# Patient Record
Sex: FEMALE | Race: WHITE | Employment: FULL TIME | URBAN - METROPOLITAN AREA
[De-identification: names, ages, dates, MRNs, and addresses within clinical notes are randomized per-mention and may not be internally consistent; named-entity substitution may affect disease eponyms.]

---

## 2021-01-11 ENCOUNTER — HOSPITAL ENCOUNTER (OUTPATIENT)
Dept: MAMMOGRAPHY | Age: 61
Discharge: HOME | End: 2021-01-13
Payer: COMMERCIAL

## 2021-01-11 DIAGNOSIS — Z12.31 VISIT FOR SCREENING MAMMOGRAM: ICD-10-CM

## 2021-01-11 PROCEDURE — 77067 SCR MAMMO BI INCL CAD: CPT

## 2021-01-13 ENCOUNTER — TELEPHONE (OUTPATIENT)
Dept: GENERAL RADIOLOGY | Age: 61
End: 2021-01-13

## 2021-01-18 ENCOUNTER — CLINICAL DOCUMENTATION (OUTPATIENT)
Dept: GENERAL RADIOLOGY | Age: 61
End: 2021-01-18

## 2021-01-20 ENCOUNTER — HOSPITAL ENCOUNTER (OUTPATIENT)
Dept: ULTRASOUND IMAGING | Age: 61
Discharge: HOME | End: 2021-01-20
Payer: COMMERCIAL

## 2021-01-20 DIAGNOSIS — N64.89 BREAST ASYMMETRY: ICD-10-CM

## 2021-01-20 PROCEDURE — 76642 ULTRASOUND BREAST LIMITED: CPT

## 2021-02-01 ENCOUNTER — OFFICE VISIT (OUTPATIENT)
Dept: SURGERY | Age: 61
End: 2021-02-01
Payer: COMMERCIAL

## 2021-02-01 VITALS
DIASTOLIC BLOOD PRESSURE: 76 MMHG | WEIGHT: 150 LBS | HEIGHT: 66 IN | RESPIRATION RATE: 18 BRPM | BODY MASS INDEX: 24.11 KG/M2 | TEMPERATURE: 97.2 F | HEART RATE: 88 BPM | SYSTOLIC BLOOD PRESSURE: 127 MMHG

## 2021-02-01 DIAGNOSIS — R92.8 ABNORMAL MAMMOGRAM OF LEFT BREAST: Primary | ICD-10-CM

## 2021-02-01 PROCEDURE — 99204 OFFICE O/P NEW MOD 45 MIN: CPT | Performed by: SURGERY

## 2021-02-01 NOTE — PROGRESS NOTES
General Surgery History and Physical    Patient's Name/Date of Birth: Dot Soares / 1960    Date: February 1, 2021     Surgeon: Miguel Garduno M.D.    PCP: Emy Hamilton MD     Chief Complaint: abnormal mammography or ultrasound    HPI:   Dot Soares is a 56 y.o. female who presents for evaluation of abnormal breast imaging with no nipple discharge,  no family history. 13 menarche, hyster in 45s for non cancer, 2 live births, and no breast feeding, no hormone replacement. One previous biopsy on left that was benign. Past Medical History:   Diagnosis Date    Enlarged uterus     Heavy menstrual bleeding     Hyperlipidemia     not on medication       Past Surgical History:   Procedure Laterality Date    BREAST SURGERY  2011    cyst benign    CYST REMOVAL      head    CYST REMOVAL  7/16/14 excision 4 sebaceous cyst scalp and skin lesion left lateral chest    DILATION AND CURETTAGE OF UTERUS      HYSTERECTOMY  04/01/2013    laparoscopic robotic assisted  with bso    TONSILLECTOMY         Current Outpatient Medications   Medication Sig Dispense Refill    Calcium-Magnesium-Vitamin D (CALCIUM 1200+D3 PO) Take 1 tablet by mouth daily. No current facility-administered medications for this visit. No Known Allergies    No family history on file.     Social History     Socioeconomic History    Marital status:      Spouse name: Not on file    Number of children: Not on file    Years of education: Not on file    Highest education level: Not on file   Occupational History    Not on file   Social Needs    Financial resource strain: Not on file    Food insecurity     Worry: Not on file     Inability: Not on file    Transportation needs     Medical: Not on file     Non-medical: Not on file   Tobacco Use    Smoking status: Never Smoker    Smokeless tobacco: Never Used   Substance and Sexual Activity    Alcohol use: No     Alcohol/week: 0.0 standard drinks     Comment: occ    Drug use: No    Sexual activity: Not on file   Lifestyle    Physical activity     Days per week: Not on file     Minutes per session: Not on file    Stress: Not on file   Relationships    Social connections     Talks on phone: Not on file     Gets together: Not on file     Attends Voodoo service: Not on file     Active member of club or organization: Not on file     Attends meetings of clubs or organizations: Not on file     Relationship status: Not on file    Intimate partner violence     Fear of current or ex partner: Not on file     Emotionally abused: Not on file     Physically abused: Not on file     Forced sexual activity: Not on file   Other Topics Concern    Not on file   Social History Narrative    Not on file           Review of Systems  Review of Systems -  General ROS: negative for - chills, fatigue or malaise  ENT ROS: negative for - hearing change, nasal congestion or nasal discharge  Allergy and Immunology ROS: negative for - hives, itchy/watery eyes or nasal congestion  Hematological and Lymphatic ROS: negative for - blood clots, blood transfusions, bruising or fatigue  Endocrine ROS: negative for - malaise/lethargy, mood swings, palpitations or polydipsia/polyuria  Breast ROS: neg for - new or changing breast lumps  Respiratory ROS: negative for - sputum changes, stridor, tachypnea or wheezing  Cardiovascular ROS: negative for - irregular heartbeat, loss of consciousness, murmur or orthopnea  Gastrointestinal ROS: negative for - constipation, diarrhea, gas/bloating, heartburn or hematemesis  Genito-Urinary ROS: negative for -  genital discharge, genital ulcers or hematuria  Musculoskeletal ROS: negative for - gait disturbance, muscle pain or muscular weakness    Physical exam:   /76   Pulse 88   Temp 97.2 °F (36.2 °C) (Temporal)   Resp 18   Ht 5' 6\" (1.676 m)   Wt 150 lb (68 kg)   LMP 04/06/2013   BMI 24.21 kg/m²   General appearance:  NAD  Head: NCAT, PERRLA, EOMI, red conjunctiva  Neck: supple, no masses  Lungs: CTAB, equal chest rise bilateral  Heart: Reg rate  Abdomen: soft, nontender, nondistended  Skin; no lesions  Breast: no a symmetry, no skin dimpling, no nipple discharge, no palpable lump   Gu: no cva tenderness  Extremities: extremities normal, atraumatic, no cyanosis or edema      Radiology:  mammo or usg: mammo 0, usg 3    Assessment:  56 y.o. female with abnormal breast imaging. Plan:  We discussed results and we agreed on 6month follow up with usg to assess, if symptoms present then will get MRI.     Gary Curiel MD  2:03 PM  2/1/2021